# Patient Record
Sex: FEMALE | Race: BLACK OR AFRICAN AMERICAN
[De-identification: names, ages, dates, MRNs, and addresses within clinical notes are randomized per-mention and may not be internally consistent; named-entity substitution may affect disease eponyms.]

---

## 2021-01-10 ENCOUNTER — HOSPITAL ENCOUNTER (EMERGENCY)
Dept: HOSPITAL 96 - M.ERS | Age: 36
Discharge: HOME | End: 2021-01-10
Payer: COMMERCIAL

## 2021-01-10 VITALS — SYSTOLIC BLOOD PRESSURE: 146 MMHG | DIASTOLIC BLOOD PRESSURE: 92 MMHG

## 2021-01-10 VITALS — HEIGHT: 59 IN | BODY MASS INDEX: 34.68 KG/M2 | WEIGHT: 172 LBS

## 2021-01-10 DIAGNOSIS — I10: Primary | ICD-10-CM

## 2021-01-10 DIAGNOSIS — Z79.899: ICD-10-CM

## 2021-01-10 LAB
ABSOLUTE BASOPHILS: 0.1 THOU/UL (ref 0–0.2)
ABSOLUTE EOSINOPHILS: 0.1 THOU/UL (ref 0–0.7)
ABSOLUTE MONOCYTES: 0.7 THOU/UL (ref 0–1.2)
ALBUMIN SERPL-MCNC: 3.6 G/DL (ref 3.4–5)
ALP SERPL-CCNC: 50 U/L (ref 46–116)
ALT SERPL-CCNC: 26 U/L (ref 30–65)
ANION GAP SERPL CALC-SCNC: 11 MMOL/L (ref 7–16)
AST SERPL-CCNC: 25 U/L (ref 15–37)
BASOPHILS NFR BLD AUTO: 0.8 %
BILIRUB SERPL-MCNC: 0.3 MG/DL
BUN SERPL-MCNC: 12 MG/DL (ref 7–18)
CALCIUM SERPL-MCNC: 8.7 MG/DL (ref 8.5–10.1)
CHLORIDE SERPL-SCNC: 105 MMOL/L (ref 98–107)
CO2 SERPL-SCNC: 28 MMOL/L (ref 21–32)
CREAT SERPL-MCNC: 1 MG/DL (ref 0.6–1.3)
EOSINOPHIL NFR BLD: 1.5 %
GLUCOSE SERPL-MCNC: 147 MG/DL (ref 70–99)
GRANULOCYTES NFR BLD MANUAL: 66.1 %
HCT VFR BLD CALC: 40.5 % (ref 37–47)
HGB BLD-MCNC: 13.6 GM/DL (ref 12–15)
LYMPHOCYTES # BLD: 1.8 THOU/UL (ref 0.8–5.3)
LYMPHOCYTES NFR BLD AUTO: 22.5 %
MCH RBC QN AUTO: 30.6 PG (ref 26–34)
MCHC RBC AUTO-ENTMCNC: 33.6 G/DL (ref 28–37)
MCV RBC: 91 FL (ref 80–100)
MONOCYTES NFR BLD: 9.1 %
MPV: 8.5 FL. (ref 7.2–11.1)
NEUTROPHILS # BLD: 5.3 THOU/UL (ref 1.6–8.1)
NUCLEATED RBCS: 0 /100WBC
PLATELET COUNT*: 194 THOU/UL (ref 150–400)
POTASSIUM SERPL-SCNC: 3.2 MMOL/L (ref 3.5–5.1)
PROT SERPL-MCNC: 7 G/DL (ref 6.4–8.2)
RBC # BLD AUTO: 4.45 MIL/UL (ref 4.2–5)
RDW-CV: 13.8 % (ref 10.5–14.5)
SODIUM SERPL-SCNC: 144 MMOL/L (ref 136–145)
WBC # BLD AUTO: 8 THOU/UL (ref 4–11)

## 2021-01-11 NOTE — EKG
[place] [address]
Phone:  [phone]                     ELECTROCARDIOGRAM REPORT      
_______________________________________________________________________________
 
Name:         PERLAALISON S            Room:                     [facility]#:    [id number]     Account #:     [id number]  
Admission:    01/10/21    Attend Phys:                     
Discharge:    01/10/21    Date of Birth: [birth year]  
Date of Service: 01/10/21 0717  Report #:      [id number]
        [id number]
_______________________________________________________________________________
THIS REPORT FOR:  //name//                      
 
                         [facility] ED
                                       
Test Date:    2021-01-10               Test Time:    07:17:26
Pat Name:     ALISON DUNCAN         Department:   
Patient ID:   [id number]            Room:          
Gender:       F                        Technician:   TDS
:          1985               Requested By: Nj Palencia
Order Number: [id number] MD:   Derrek Nettles
                                 Measurements
Intervals                              Axis          
Rate:         86                       P:            54
ID:           [id number]                      QRS:          36
QRSD:         98                       T:            173
QT:           380                                    
QTc:          455                                    
                           Interpretive Statements
Sinus rhythm
Probable left atrial enlargement
LVH with secondary repolarization abnormality
Anterior Q waves, possibly due to LVH
No previous ECG available for comparison
Electronically Signed On 2021 10:07:00 CST by Derrek Nettles
[email or web address]
 
 
 
 
 
 
 
 
 
 
 
 
 
 
 
 
 
 
 
  <ELECTRONICALLY SIGNED>
                                           By: Derrek Nettles MD, FACC     
  21     1007
D: 01/10/21 0717   _____________________________________
T: 01/10/21 0717   Derrek Nettles MD, [facility]       /EPI

## 2021-02-18 ENCOUNTER — HOSPITAL ENCOUNTER (EMERGENCY)
Dept: HOSPITAL 96 - M.ERS | Age: 36
Discharge: HOME | End: 2021-02-18
Payer: COMMERCIAL

## 2021-02-18 VITALS — WEIGHT: 175 LBS | BODY MASS INDEX: 35.28 KG/M2 | HEIGHT: 59 IN

## 2021-02-18 VITALS — DIASTOLIC BLOOD PRESSURE: 70 MMHG | SYSTOLIC BLOOD PRESSURE: 132 MMHG

## 2021-02-18 DIAGNOSIS — Z79.899: ICD-10-CM

## 2021-02-18 DIAGNOSIS — I10: ICD-10-CM

## 2021-02-18 DIAGNOSIS — R11.2: ICD-10-CM

## 2021-02-18 DIAGNOSIS — R10.12: Primary | ICD-10-CM

## 2021-02-18 LAB
ABSOLUTE MONOCYTES: 0.3 THOU/UL (ref 0–1.2)
ALBUMIN SERPL-MCNC: 3.9 G/DL (ref 3.4–5)
ALP SERPL-CCNC: 60 U/L (ref 46–116)
ALT SERPL-CCNC: 22 U/L (ref 30–65)
ANION GAP SERPL CALC-SCNC: 12 MMOL/L (ref 7–16)
AST SERPL-CCNC: 19 U/L (ref 15–37)
BILIRUB SERPL-MCNC: 0.8 MG/DL
BILIRUB UR-MCNC: NEGATIVE MG/DL
BUN SERPL-MCNC: 17 MG/DL (ref 7–18)
CALCIUM SERPL-MCNC: 9.2 MG/DL (ref 8.5–10.1)
CHLORIDE SERPL-SCNC: 97 MMOL/L (ref 98–107)
CO2 SERPL-SCNC: 26 MMOL/L (ref 21–32)
COLOR UR: YELLOW
CREAT SERPL-MCNC: 1 MG/DL (ref 0.6–1.3)
GLUCOSE SERPL-MCNC: 128 MG/DL (ref 70–99)
GRANULOCYTES NFR BLD MANUAL: 94 %
HCT VFR BLD CALC: 43 % (ref 37–47)
HGB BLD-MCNC: 14.5 GM/DL (ref 12–15)
KETONES UR STRIP-MCNC: NEGATIVE MG/DL
LIPASE: 158 U/L (ref 73–393)
LYMPHOCYTES # BLD: 0.6 THOU/UL (ref 0.8–5.3)
LYMPHOCYTES NFR BLD AUTO: 4 %
MCH RBC QN AUTO: 30.7 PG (ref 26–34)
MCHC RBC AUTO-ENTMCNC: 33.7 G/DL (ref 28–37)
MCV RBC: 91.1 FL (ref 80–100)
MONOCYTES NFR BLD: 2 %
MPV: 8.1 FL. (ref 7.2–11.1)
NEUTROPHILS # BLD: 13.9 THOU/UL (ref 1.6–8.1)
NUCLEATED RBCS: 0 /100WBC
PLATELET # BLD EST: ADEQUATE 10*3/UL
PLATELET COUNT*: 228 THOU/UL (ref 150–400)
POTASSIUM SERPL-SCNC: 3.2 MMOL/L (ref 3.5–5.1)
PROT SERPL-MCNC: 7.7 G/DL (ref 6.4–8.2)
PROT UR QL STRIP: (no result)
RBC # BLD AUTO: 4.72 MIL/UL (ref 4.2–5)
RBC # UR STRIP: (no result) /UL
RBC #/AREA URNS HPF: (no result) /HPF (ref 0–2)
RBC MORPH BLD: NORMAL
RDW-CV: 14.3 % (ref 10.5–14.5)
SODIUM SERPL-SCNC: 135 MMOL/L (ref 136–145)
SP GR UR STRIP: >= 1.03 (ref 1–1.03)
SQUAMOUS: (no result) /LPF (ref 0–3)
URINE CLARITY: (no result)
URINE GLUCOSE-RANDOM: NEGATIVE
URINE LEUKOCYTES-REFLEX: NEGATIVE
URINE NITRITE-REFLEX: NEGATIVE
UROBILINOGEN UR STRIP-ACNC: 0.2 E.U./DL (ref 0.2–1)
WBC # BLD AUTO: 14.8 THOU/UL (ref 4–11)